# Patient Record
(demographics unavailable — no encounter records)

---

## 2025-03-18 NOTE — HISTORY OF PRESENT ILLNESS
[TextBox_4] : Patient is a 61 yo female here today for annual visit. She denies any GYN complaints at this time on boniva tolerating well, using estradiol vaginal cream. Hx of BSO.

## 2025-03-18 NOTE — PHYSICAL EXAM
[Appropriately responsive] : appropriately responsive [Alert] : alert [No Acute Distress] : no acute distress [No Lymphadenopathy] : no lymphadenopathy [Soft] : soft [Non-tender] : non-tender [Non-distended] : non-distended [No HSM] : No HSM [No Lesions] : no lesions [No Mass] : no mass [Oriented x3] : oriented x3 [Examination Of The Breasts] : a normal appearance [No Discharge] : no discharge [No Masses] : no breast masses were palpable [Labia Majora] : normal [Labia Minora] : normal [Normal] : normal [Uterine Adnexae] : absent [Normal rectal exam] : was normal [Occult Blood Positive] : was negative for occult blood analysis

## 2025-03-18 NOTE — PLAN
[FreeTextEntry1] : Patient to follow up in 1 year for annual GYN exam Mammogram and bilateral breast US due:  10/2025 rx given  [place]: done with PMD about 1 year ago  Bone density due:  10/2025 rx given   wants to start HT, i advised due to 10+ years in menopause she is not a great candidate as it can increase risk of cardiovascular disease, Alzheimer's dementia and diabetes.   boniva and estradiol sent.    Pap ordered Hemoccult ordered  All questions answered, patient agreeable with plan.

## 2025-06-09 NOTE — HISTORY OF PRESENT ILLNESS
[FreeTextEntry1] : The patient is a 62-year-old active female who was performing Pilates on Saturday morning and felt a sharp pain in the back portion of her right calf consistent with a strain.  She is using crutches to walk.  She went to the urgent care where they diagnosed her with a calf strain and gave her a steroid pack.  She is currently utilizing a steroid pack.  Her pain scale with weightbearing can increase to a 7 out of 10.  She denies fevers, chills, night sweats, shortness of breath.  No other complaints.  She presents with her son in office today.

## 2025-06-09 NOTE — DISCUSSION/SUMMARY
[de-identified] : Patient diagnosis is calf strain.  Long cam walking boot given and she can weight-bear as tolerated with the boot but if she has pain she will continue to utilize crutches.  A venous Doppler ultrasound/nonvascular ultrasound was ordered for her right lower extremity to evaluate the calf.  Physical therapy prescription given for right calf strain protocol.  Patient will follow-up in 3 weeks for reevaluation and to make sure the calf pain is improving.  All of her questions were answered.  She understood and agreed to the treatment course.  I will call her with the ultrasound results.

## 2025-06-09 NOTE — PHYSICAL EXAM
[de-identified] : Physical examination of the right calf:  The patient has severe tenderness to palpation when palpating mid calf muscle belly both medial and lateral gastrocnemius muscle bellies.  Negative Burrell test, Achilles is intact.  Full knee and ankle range of motion.  Neurovascularly intact.  Dorsalis pedis pulse in the foot is normal.  Capillary refill in the digits is less than 2 seconds. [de-identified] : X-rays of the right leg reviewed from outside facility were negative.